# Patient Record
Sex: FEMALE | Race: WHITE | NOT HISPANIC OR LATINO | Employment: STUDENT | URBAN - NONMETROPOLITAN AREA
[De-identification: names, ages, dates, MRNs, and addresses within clinical notes are randomized per-mention and may not be internally consistent; named-entity substitution may affect disease eponyms.]

---
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
## 2025-03-05 NOTE — PROGRESS NOTES
"Subjective    MsAfia Edwards is 19 y.o. female    Chief Complaint: Burning with urination, urine frequency and urgency    History of Present Illness    19-year-old female new patient referred by PCP due to new onset castrejon over the last 6 months with suspected urinary tract infections.  Reports has been on multiple different rounds of antibiotics with symptoms remaining.  Only 1 urine culture available to review within the last 2 months showing no bacterial growth.  Patient reports has recently started taking notice certain foods and beverages causing \"flares\".  Grape juice and dark sodas specifically.  Patient reports has been able to switch to Sprite or 7-Up without any difficulty.    The following portions of the patient's history were reviewed and updated as appropriate: allergies, current medications, past family history, past medical history, past social history, past surgical history and problem list.    Review of Systems   Constitutional:  Negative for chills, fatigue and fever.   Gastrointestinal:  Negative for nausea and vomiting.   Genitourinary:  Positive for dysuria, frequency, pelvic pain and urgency.         Current Outpatient Medications:     Acetaminophen (TYLENOL PO), Take  by mouth As Needed (last taken at approx. 9 p.m.  last night)., Disp: , Rfl:     Mono-Linyah 0.25-35 MG-MCG per tablet, , Disp: , Rfl:     SUMAtriptan (IMITREX) 50 MG tablet, , Disp: , Rfl:     Past Medical History:   Diagnosis Date    Otitis media     during infancy, resolved due to ear tubes       Past Surgical History:   Procedure Laterality Date    ADENOIDECTOMY  05/2006    TONSILLECTOMY  05/2006    TYMPANOSTOMY TUBE PLACEMENT  05/2004       Social History     Socioeconomic History    Marital status: Single   Tobacco Use    Smoking status: Never     Passive exposure: Never    Smokeless tobacco: Never   Vaping Use    Vaping status: Never Used   Substance and Sexual Activity    Alcohol use: Not Currently    Drug use: Never    " "Sexual activity: Defer       Family History   Problem Relation Age of Onset    Hypertension Father     Hypertension Maternal Grandmother     Diabetes Maternal Aunt     Diabetes Maternal Uncle        Objective    Temp 97.5 °F (36.4 °C)   Ht 167.6 cm (66\")   Wt 63.1 kg (139 lb 3.2 oz)   BMI 22.47 kg/m²     Physical Exam  Nursing note reviewed.   Constitutional:       General: She is not in acute distress.     Appearance: Normal appearance. She is not ill-appearing.   HENT:      Nose: No congestion.   Abdominal:      Tenderness: There is no right CVA tenderness or left CVA tenderness.      Hernia: No hernia is present.   Skin:     General: Skin is warm and dry.   Neurological:      Mental Status: She is alert and oriented to person, place, and time.   Psychiatric:         Mood and Affect: Mood normal.         Behavior: Behavior normal.             Results for orders placed or performed in visit on 03/13/25   POC Urinalysis Dipstick, Multipro    Collection Time: 03/13/25 10:00 AM    Specimen: Urine   Result Value Ref Range    Color Yellow Yellow, Straw, Dark Yellow, Alejandra    Clarity, UA Clear Clear    Glucose, UA Negative Negative mg/dL    Bilirubin Negative Negative    Ketones, UA Negative Negative    Specific Gravity  1.025 1.005 - 1.030    Blood, UA Negative Negative    pH, Urine 7.0 5.0 - 8.0    Protein, POC Trace (A) Negative mg/dL    Urobilinogen, UA 0.2 E.U./dL Normal, 0.2 E.U./dL    Nitrite, UA Negative Negative    Leukocytes Negative Negative     Assessment and Plan    Diagnoses and all orders for this visit:    1. Dysuria (Primary)  -     POC Urinalysis Dipstick, Multipro      Urinalysis is clear no signs of infection.  Patient currently asymptomatic however reports had a flare yesterday morning after consumption of grape juice for which patient states she took a half of an Azo tablet and drink 3 glasses of water and symptoms seem to improve.      Will request further records from patient PCP as I currently " only have 1 urine culture to review as I am trying to determine if patient is in fact truly battling recurrent infections or if patient is suffering more from a chronic pelvic pain syndrome    Have encouraged patient to research IC diet and tried to eliminate spicy/high acidic foods and beverages    Will follow-up 3 months

## 2025-03-13 ENCOUNTER — OFFICE VISIT (OUTPATIENT)
Dept: UROLOGY | Facility: CLINIC | Age: 20
End: 2025-03-13
Payer: COMMERCIAL

## 2025-03-13 VITALS — TEMPERATURE: 97.5 F | HEIGHT: 66 IN | BODY MASS INDEX: 22.37 KG/M2 | WEIGHT: 139.2 LBS

## 2025-03-13 DIAGNOSIS — R30.0 DYSURIA: Primary | ICD-10-CM

## 2025-03-13 LAB
BILIRUB BLD-MCNC: NEGATIVE MG/DL
CLARITY, POC: CLEAR
COLOR UR: YELLOW
GLUCOSE UR STRIP-MCNC: NEGATIVE MG/DL
KETONES UR QL: NEGATIVE
LEUKOCYTE EST, POC: NEGATIVE
NITRITE UR-MCNC: NEGATIVE MG/ML
PH UR: 7 [PH] (ref 5–8)
PROT UR STRIP-MCNC: ABNORMAL MG/DL
RBC # UR STRIP: NEGATIVE /UL
SP GR UR: 1.02 (ref 1–1.03)
UROBILINOGEN UR QL: ABNORMAL

## 2025-03-13 RX ORDER — NORGESTIMATE AND ETHINYL ESTRADIOL 0.25-0.035
KIT ORAL
COMMUNITY
Start: 2025-02-28

## 2025-03-13 RX ORDER — SUMATRIPTAN 50 MG/1
TABLET, FILM COATED ORAL
COMMUNITY
Start: 2025-01-31

## 2025-03-14 ENCOUNTER — TELEPHONE (OUTPATIENT)
Dept: UROLOGY | Facility: CLINIC | Age: 20
End: 2025-03-14
Payer: COMMERCIAL

## 2025-04-23 NOTE — PROGRESS NOTES
"Subjective    MsAfia Edwards is 19 y.o. female    Chief Complaint: Return of burning with urination, urethral pain and urine frequency    History of Present Illness    19-year-old female established patient in with complaints of return of burning with urination, urethral pain and urine frequency.  Was seen last month in clinic due to reported new onset castreojn over the last 6 months of what was felt to be urinary tract infections.  Patient previously reported multiple different rounds of antibiotics with symptoms remaining.  Had only 1 urine culture available to review within the last few months showing no bacterial growth.  Never received any further records from patient PCP of any cultures.  Patient had noted a correlation between certain foods and beverages causing \"flares\" of which being grape juice and dark sodas.    Now reporting over the last month had been doing well until approximately 6 days ago when symptoms returned.  Over the last month patient had researched IC diet and has significantly tried to change her diet around.  Patient does report 1 episode within the last month where patient consumed cantaloupe and 1 sip of a Pepsi to get some Tylenol down and within 24 hours was experiencing discomfort.    Patient currently unable to recall anything different she has done in her diet since this last Friday when symptoms returned and have only slightly subsided.  The only new thing patient added in was Monster energy drinks however patient states she did not drink an energy drink 1 the following day and then retried it a day or 2 later and experienced no symptoms therefore she does not feel as though the Monster is the cause.    The following portions of the patient's history were reviewed and updated as appropriate: allergies, current medications, past family history, past medical history, past social history, past surgical history and problem list.    Review of Systems      Current Outpatient Medications:     " Acetaminophen (TYLENOL PO), Take  by mouth As Needed (last taken at approx. 9 p.m.  last night)., Disp: , Rfl:     Mono-Linyah 0.25-35 MG-MCG per tablet, , Disp: , Rfl:     SUMAtriptan (IMITREX) 50 MG tablet, , Disp: , Rfl:     cimetidine (TAGAMET) 300 MG tablet, Take 1 tablet by mouth 2 (Two) Times a Day., Disp: 60 tablet, Rfl: 11    Cranberry-Vitamin C (AZO CRANBERRY URINARY TRACT PO), Take  by mouth., Disp: , Rfl:     Past Medical History:   Diagnosis Date    Otitis media     during infancy, resolved due to ear tubes       Past Surgical History:   Procedure Laterality Date    ADENOIDECTOMY  05/2006    TONSILLECTOMY  05/2006    TYMPANOSTOMY TUBE PLACEMENT  05/2004       Social History     Socioeconomic History    Marital status: Single   Tobacco Use    Smoking status: Never     Passive exposure: Never    Smokeless tobacco: Never   Vaping Use    Vaping status: Never Used   Substance and Sexual Activity    Alcohol use: Not Currently    Drug use: Never    Sexual activity: Defer       Family History   Problem Relation Age of Onset    Hypertension Father     Hypertension Maternal Grandmother     Diabetes Maternal Aunt     Diabetes Maternal Uncle        Objective    There were no vitals taken for this visit.    Physical Exam        Results for orders placed or performed in visit on 04/24/25   POC Urinalysis Dipstick, Multipro    Collection Time: 04/24/25  8:49 AM    Specimen: Urine   Result Value Ref Range    Color Yellow Yellow, Straw, Dark Yellow, Alejandra    Clarity, UA Clear Clear    Glucose, UA Negative Negative mg/dL    Bilirubin Negative Negative    Ketones, UA Negative Negative    Specific Gravity  1.010 1.005 - 1.030    Blood, UA Trace (A) Negative    pH, Urine 6.0 5.0 - 8.0    Protein, POC Negative Negative mg/dL    Urobilinogen, UA 0.2 E.U./dL Normal, 0.2 E.U./dL    Nitrite, UA Negative Negative    Leukocytes Negative Negative     Assessment and Plan    Diagnoses and all orders for this visit:    1. Dysuria  (Primary)  -     POC Urinalysis Dipstick, Multipro  -     cimetidine (TAGAMET) 300 MG tablet; Take 1 tablet by mouth 2 (Two) Times a Day.  Dispense: 60 tablet; Refill: 11      Will send urine out through guidance UTI to rule out any possible infection contributing to return of symptoms.  We had a lengthy discussion regarding chronic pelvic pain syndromes and urethral pain.  We discussed off label usages of certain medications we discussed amitriptyline however patient states she would like to hold off on any type of antidepressant at this time as she does not feel she has any issues from that standpoint.  We also discussed off label use of cimetidine.  Patient states she is willing to give this a try.  Will have patient take 300 mg twice daily    Patient to keep scheduled follow-up

## 2025-04-24 ENCOUNTER — OFFICE VISIT (OUTPATIENT)
Dept: UROLOGY | Facility: CLINIC | Age: 20
End: 2025-04-24
Payer: COMMERCIAL

## 2025-04-24 DIAGNOSIS — R30.0 DYSURIA: Primary | ICD-10-CM

## 2025-04-24 LAB
BILIRUB BLD-MCNC: NEGATIVE MG/DL
CLARITY, POC: CLEAR
COLOR UR: YELLOW
GLUCOSE UR STRIP-MCNC: NEGATIVE MG/DL
KETONES UR QL: NEGATIVE
LEUKOCYTE EST, POC: NEGATIVE
NITRITE UR-MCNC: NEGATIVE MG/ML
PH UR: 6 [PH] (ref 5–8)
PROT UR STRIP-MCNC: NEGATIVE MG/DL
RBC # UR STRIP: ABNORMAL /UL
SP GR UR: 1.01 (ref 1–1.03)
UROBILINOGEN UR QL: ABNORMAL

## 2025-04-24 RX ORDER — CIMETIDINE 300 MG
300 TABLET ORAL 2 TIMES DAILY
Qty: 60 TABLET | Refills: 11 | Status: SHIPPED | OUTPATIENT
Start: 2025-04-24

## 2025-06-12 ENCOUNTER — TELEPHONE (OUTPATIENT)
Dept: UROLOGY | Facility: CLINIC | Age: 20
End: 2025-06-12
Payer: COMMERCIAL

## 2025-08-13 ENCOUNTER — TELEPHONE (OUTPATIENT)
Dept: UROLOGY | Facility: CLINIC | Age: 20
End: 2025-08-13
Payer: COMMERCIAL